# Patient Record
Sex: MALE | Race: WHITE | NOT HISPANIC OR LATINO | Employment: FULL TIME | URBAN - NONMETROPOLITAN AREA
[De-identification: names, ages, dates, MRNs, and addresses within clinical notes are randomized per-mention and may not be internally consistent; named-entity substitution may affect disease eponyms.]

---

## 2018-02-02 ENCOUNTER — HOSPITAL ENCOUNTER (EMERGENCY)
Facility: HOSPITAL | Age: 48
Discharge: HOME | End: 2018-02-02
Attending: NURSE PRACTITIONER | Admitting: NURSE PRACTITIONER
Payer: OTHER MISCELLANEOUS

## 2018-02-02 VITALS
OXYGEN SATURATION: 97 % | RESPIRATION RATE: 17 BRPM | SYSTOLIC BLOOD PRESSURE: 145 MMHG | HEART RATE: 69 BPM | TEMPERATURE: 98.4 F | DIASTOLIC BLOOD PRESSURE: 92 MMHG

## 2018-02-02 DIAGNOSIS — T30.0 BURN, FIRST DEGREE: ICD-10-CM

## 2018-02-02 PROCEDURE — 99202 OFFICE O/P NEW SF 15 MIN: CPT | Performed by: NURSE PRACTITIONER

## 2018-02-02 PROCEDURE — 90715 TDAP VACCINE 7 YRS/> IM: CPT | Performed by: NURSE PRACTITIONER

## 2018-02-02 PROCEDURE — 96372 THER/PROPH/DIAG INJ SC/IM: CPT

## 2018-02-02 PROCEDURE — G0463 HOSPITAL OUTPT CLINIC VISIT: HCPCS | Mod: 25

## 2018-02-02 PROCEDURE — 25000128 H RX IP 250 OP 636: Performed by: NURSE PRACTITIONER

## 2018-02-02 PROCEDURE — 90471 IMMUNIZATION ADMIN: CPT

## 2018-02-02 RX ORDER — SILVER SULFADIAZINE 10 MG/G
CREAM TOPICAL 2 TIMES DAILY
Qty: 50 G | Refills: 0 | Status: SHIPPED | OUTPATIENT
Start: 2018-02-02 | End: 2018-05-22

## 2018-02-02 RX ORDER — SILVER SULFADIAZINE 10 MG/G
CREAM TOPICAL ONCE
Status: DISCONTINUED | OUTPATIENT
Start: 2018-02-02 | End: 2018-02-02 | Stop reason: HOSPADM

## 2018-02-02 RX ADMIN — CLOSTRIDIUM TETANI TOXOID ANTIGEN (FORMALDEHYDE INACTIVATED), CORYNEBACTERIUM DIPHTHERIAE TOXOID ANTIGEN (FORMALDEHYDE INACTIVATED), BORDETELLA PERTUSSIS TOXOID ANTIGEN (GLUTARALDEHYDE INACTIVATED), BORDETELLA PERTUSSIS FILAMENTOUS HEMAGGLUTININ ANTIGEN (FORMALDEHYDE INACTIVATED), BORDETELLA PERTUSSIS PERTACTIN ANTIGEN, AND BORDETELLA PERTUSSIS FIMBRIAE 2/3 ANTIGEN 0.5 ML: 5; 2; 2.5; 5; 3; 5 INJECTION, SUSPENSION INTRAMUSCULAR at 09:15

## 2018-02-02 ASSESSMENT — ENCOUNTER SYMPTOMS
APPETITE CHANGE: 0
DYSURIA: 0
ACTIVITY CHANGE: 0
WOUND: 1
PSYCHIATRIC NEGATIVE: 1
WEAKNESS: 0
COUGH: 0
TROUBLE SWALLOWING: 0
VOMITING: 0
CHILLS: 0
COLOR CHANGE: 1
FEVER: 0
NAUSEA: 0
DIARRHEA: 0

## 2018-02-02 NOTE — DISCHARGE INSTRUCTIONS
Use Silvadene cream as directed.   Take tylenol and/ibuprofen for pain. Follow directions on package.   No work today.   Follow up on 2/5/18 at 0900 with Dr. Tushar Martino for a wound check.   Return to urgent care or emergency department with any increase in symptoms or concerns.

## 2018-02-02 NOTE — ED AVS SNAPSHOT
[facility]    [address]    [facility] [place] [place] [address]    Phone:  [phone]                                       Bayron King   MRN: [id number]    Department:  [facility]   Date of Visit:  2/2/2018           Patient Information     Date Of Birth          1970        Your diagnoses for this visit were:     Burn, first degree        You were seen by Nicole Cosby NP.      Follow-up Information     Follow up with SIDRA Martino MD In 3 days.    Specialty:  Family Practice    Why:  Appointment made on 2/5/2018 at 0900.     Contact information:    [facility] [place]  [address]  [place] [address] [phone]          Follow up with [facility].    Specialty:  EMERGENCY MEDICINE    Why:  As needed, If symptoms worsen    Contact information:    [address] [address] [phone]    Additional information:    From [place]: Take [address]. Turn left at [address]. Turn left at the first stoplight on [address]. At the first stop sign, take a right onto [address]. Take a left into the parking lot and continue through until you reach the North enterance of the building.       From [place]: Take [address]. Take the [address] ramp towards [place]. Turn left onto [address]. Take a slight right onto [address]. Turn left at the first stoplight on [address]. At the first stop sign, take a right onto [address]. Take a left into the parking lot and continue through until you reach the North enterance of the building.       From [place]: Take [address]. Take a right at [address]. At the first stop sign, take a right onto [address]. Take a left into the parking lot and continue through until you reach the North enterance of the building.         Discharge Instructions       Use Silvadene cream as directed.   Take tylenol  and/ibuprofen for pain. Follow directions on package.   No work today.   Follow up on 2/5/18 at 0900 with Dr. Tushar Martino for a wound check.   Return to urgent care or emergency department with any increase in symptoms or concerns.     Discharge References/Attachments     BURN, THERMAL, 1ST- AND 2ND-DEGREE W/ DRESSING (ENGLISH)      Future Appointments        Provider Department Dept Phone Center    2/5/2018 9:15 AM SIDRA Martino MD [facility] [phone] [facility]         Review of your medicines      START taking        Dose / Directions Last dose taken    silver sulfADIAZINE 1 % cream   Commonly known as:  SILVADENE   Quantity:  50 g        Apply topically 2 times daily   Refills:  0          Our records show that you are taking the medicines listed below. If these are incorrect, please call your family doctor or clinic.        Dose / Directions Last dose taken    MULTIVITAMIN ADULTS PO   Dose:  1 tablet        Take 1 tablet by mouth   Refills:  0        OMEPRAZOLE PO        Refills:  0                Prescriptions were sent or printed at these locations (1 Prescription)                   [facility]  [address]   [address] [facility] [place] [address]    Telephone:  [phone]   Fax:  [phone]   Hours:                  E-Prescribed (1 of 1)         silver sulfADIAZINE (SILVADENE) 1 % cream                Orders Needing Specimen Collection     None      Pending Results     No orders found from 1/31/2018 to 2/3/2018.            Pending Culture Results     No orders found from 1/31/2018 to 2/3/2018.            Thank you for choosing [facility]       Thank you for choosing [facility] for your care. Our goal is always to provide you with excellent care. Hearing back from our patients is one way we can continue to improve our services. Please take a few minutes to complete the written survey that you may receive in the mail after you visit with us. Thank you!       "  MyChart Information     [employer]fox lets you send messages to your doctor, view your test results, renew your prescriptions, schedule appointments and more. To sign up, go to [email or web address] . Click on \"Log in\" on the left side of the screen, which will take you to the Welcome page. Then click on \"Sign up Now\" on the right side of the page.     You will be asked to enter the access code listed below, as well as some personal information. Please follow the directions to create your username and password.     Your access code is: [email or web address]  Expires: 5/3/2018  9:33 AM     Your access code will  in 90 days. If you need help or a new code, please call your [place] clinic or [phone].        Care EveryWhere ID     This is your Care EveryWhere ID. This could be used by other organizations to access your [place] medical records  [id number]        Equal Access to Services     DAVID BRASHER : Hadbassam mehtao Soblanca, waaxda luracquel, qaybta kaalmamari chavez, jerilyn castelan . So [facility] [phone].    ATENCIÓN: Si habla español, tiene a wright disposición servicios gratuitos de asistencia lingüística. Llame al [phone].    We comply with applicable federal civil rights laws and [place] laws. We do not discriminate on the basis of race, color, national origin, age, disability, sex, sexual orientation, or gender identity.            After Visit Summary       This is your record. Keep this with you and show to your community pharmacist(s) and doctor(s) at your next visit.                  "

## 2018-02-02 NOTE — ED NOTES
Pt has a painful burn to his right hand after he grabbed a railing that had just been welded on 15 mins prior this morning at work.

## 2018-02-02 NOTE — ED AVS SNAPSHOT
[facility]    [address] [address]    Phone:  [phone]                                       Bayron King   MRN: [id number]    Department:  [facility]   Date of Visit:  2/2/2018           After Visit Summary Signature Page     I have received my discharge instructions, and my questions have been answered. I have discussed any challenges I see with this plan with the nurse or doctor.    ..........................................................................................................................................  Patient/Patient Representative Signature      ..........................................................................................................................................  Patient Representative Print Name and Relationship to Patient    ..................................................               ................................................  Date                                            Time    ..........................................................................................................................................  Reviewed by Signature/Title    ...................................................              ..............................................  Date                                                            Time

## 2018-02-02 NOTE — ED PROVIDER NOTES
History     Chief Complaint   Patient presents with     Hand Burn     right hand burned on a hot rail that had just been welded while at work at [employer].     The history is provided by the patient. No  was used.     Bayron King is a 47 year old male who presents with a burn on his right hand. He burnt his right hand on a hand rail at work that was welded 15 minutes prior. 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 He works for [employer]. He applied a cooling gel and the paramedics wrapped his hand. He works as an  and is right hand dominant. Denies fever, chills, or night sweats. Eating and drinking well. Bowel and bladder are working well. Last tetanus was in 2010.        Medications:      Multiple Vitamins-Minerals (MULTIVITAMIN ADULTS PO)   silver sulfADIAZINE (SILVADENE) 1 % cream   OMEPRAZOLE PO         Review of Systems   Constitutional: Negative for activity change, appetite change, chills and fever.   HENT: Negative for trouble swallowing.    Respiratory: Negative for cough.    Gastrointestinal: Negative for diarrhea, nausea and vomiting.   Genitourinary: Negative for dysuria.   Skin: Positive for color change and wound.        Burn to right hand.    Neurological: Negative for weakness.   Psychiatric/Behavioral: Negative.        Physical Exam   BP: 145/92  Pulse: 69  Temp: 98.4  F (36.9  C)  Resp: 17  SpO2: 97 %      Physical Exam   Constitutional: He is oriented to person, place, and time. He appears well-developed and well-nourished. No distress.   HENT:   Head: Normocephalic.   Mouth/Throat: Oropharynx is clear and moist.   Neck: Normal range of motion. Neck supple.   Cardiovascular: Normal rate, regular rhythm, normal heart sounds and intact distal pulses.    No murmur heard.  Pulmonary/Chest: Effort normal. No respiratory distress. He has no wheezes. He has no rales.   Abdominal: Soft. He exhibits  no distension.   Musculoskeletal: Normal range of motion. He exhibits tenderness. He exhibits no edema or deformity.        Hands:  CMS and ROM intact to right upper extremity. Right radial pulse +2. Extension and flexion intact to all digits on right hand. Palmar surface across middle of all digits on right hand with a white discoloration and scant small pinpoint blister formation. Area is consistent with a hand grasp to a hand rail.    Lymphadenopathy:     He has no cervical adenopathy.   Neurological: He is alert and oriented to person, place, and time. He exhibits normal muscle tone.   Skin: Skin is warm and dry. No rash noted. He is not diaphoretic.   Psychiatric: He has a normal mood and affect. His behavior is normal.   Nursing note and vitals reviewed.      ED Course     ED Course     Procedures    Medications   Tdap (tetanus-diphtheria-acell pertussis) (ADACEL) injection 0.5 mL (0.5 mLs Intramuscular Given 2/2/18 0915)     Monitored for 20 minutes and tolerated well.     Washed cooling gel off in exam room.     Silvadene and dressing applied per LPN. Silvadene felt better to burn area.     Assessments & Plan (with Medical Decision Making)     Discussed plan of care. He verbalized understanding. All questions answered.     I have reviewed the nursing notes.    I have reviewed the findings, diagnosis, plan and need for follow up with the patient.  Discharged in stable condition.     Discharge Medication List as of 2/2/2018  9:33 AM      START taking these medications    Details   silver sulfADIAZINE (SILVADENE) 1 % cream Apply topically 2 times dailyDisp-50 g, [id number]             Final diagnoses:   Burn, first degree     Use Silvadene cream as directed.   Take tylenol and/ibuprofen for pain. Follow directions on package.   No work today.   Follow up on 2/5/18 at 0900 with Dr. Tushar Martino at [facility] [place] for a wound check.   Return to urgent care or emergency department with any  increase in symptoms or concerns.     MICHELLE Nesbitt  2/2/2018  9:00 AM  URGENT CARE CLINIC       Nicole Cosby NP  02/02/18 [phone]

## 2018-02-05 ENCOUNTER — OFFICE VISIT (OUTPATIENT)
Dept: FAMILY MEDICINE | Facility: OTHER | Age: 48
End: 2018-02-05
Attending: FAMILY MEDICINE
Payer: OTHER MISCELLANEOUS

## 2018-02-05 VITALS
SYSTOLIC BLOOD PRESSURE: 124 MMHG | HEIGHT: 69 IN | DIASTOLIC BLOOD PRESSURE: 78 MMHG | BODY MASS INDEX: 31.7 KG/M2 | TEMPERATURE: 97 F | HEART RATE: 68 BPM | OXYGEN SATURATION: 98 % | WEIGHT: 214 LBS

## 2018-02-05 DIAGNOSIS — T23.151D: Primary | ICD-10-CM

## 2018-02-05 PROCEDURE — 99213 OFFICE O/P EST LOW 20 MIN: CPT | Performed by: FAMILY MEDICINE

## 2018-02-05 ASSESSMENT — PAIN SCALES - GENERAL: PAINLEVEL: NO PAIN (0)

## 2018-02-05 NOTE — NURSING NOTE
"Chief Complaint   Patient presents with     Work Comp     burn to right hand        Initial /78 (BP Location: Left arm, Patient Position: Chair, Cuff Size: Adult Large)  Pulse 68  Temp 97  F (36.1  C) (Tympanic)  Ht 5' 9.25\" (1.759 m)  Wt 214 lb (97.1 kg)  SpO2 98%  BMI 31.37 kg/m2 Estimated body mass index is 31.37 kg/(m^2) as calculated from the following:    Height as of this encounter: 5' 9.25\" (1.759 m).    Weight as of this encounter: 214 lb (97.1 kg).  Medication Reconciliation: complete   Paulina Wynne CMA(AAMA)   "

## 2018-02-05 NOTE — MR AVS SNAPSHOT
"              After Visit Summary   2/5/2018    Bayron King    MRN: [id number]           Patient Information     Date Of Birth          1970        Visit Information        Provider Department      2/5/2018 9:15 AM SIDRA Martino MD [facility] [place]         Follow-ups after your visit        Your next 10 appointments already scheduled     Feb 05, 2018  9:15 AM CST   (Arrive by 9:00 AM)   SHORT with SIDRA Martino MD   [facility] [place] ([facility] [place] )    [address]  [place] [address]   [phone]              Who to contact     If you have questions or need follow up information about today's clinic visit or your schedule please contact [facility] directly at [phone].  Normal or non-critical lab and imaging results will be communicated to you by MyChart, letter or phone within 4 business days after the clinic has received the results. If you do not hear from us within 7 days, please contact the clinic through MyChart or phone. If you have a critical or abnormal lab result, we will notify you by phone as soon as possible.  Submit refill requests through [employer] or call your pharmacy and they will forward the refill request to us. Please allow 3 business days for your refill to be completed.          Additional Information About Your Visit        Care EveryWhere ID     This is your Care EveryWhere ID. This could be used by other organizations to access your [place] medical records  [id number]        Your Vitals Were     Pulse Temperature Height Pulse Oximetry BMI (Body Mass Index)       68 97  F (36.1  C) (Tympanic) 5' 9.25\" (1.759 m) 98% 31.37 kg/m2        Blood Pressure from Last 3 Encounters:   02/05/18 124/78   02/02/18 145/92   09/09/13 137/95    Weight from Last 3 Encounters:   02/05/18 214 lb (97.1 kg)              Today, you had the following     No orders found for display       Primary Care Provider Fax #    Physician No Ref-Primary " [phone]       No address on file        Equal Access to Services     FALLONLENORA SAMEERA : Hadii aad ku hadpatricknaldo Merdano, waasifmari barber, ivelissenanci gravesmamari chavez, jerilyn hobsonamauritoya santa. So [facility] [phone].    ATENCIÓN: Si habla español, tiene a wright disposición servicios gratuitos de asistencia lingüística. Llame al [phone].    We comply with applicable federal civil rights laws and [place] laws. We do not discriminate on the basis of race, color, national origin, age, disability, sex, sexual orientation, or gender identity.            Thank you!     Thank you for choosing [facility]  for your care. Our goal is always to provide you with excellent care. Hearing back from our patients is one way we can continue to improve our services. Please take a few minutes to complete the written survey that you may receive in the mail after your visit with us. Thank you!             Your Updated Medication List - Protect others around you: Learn how to safely use, store and throw away your medicines at www.disposemymeds.org.          This list is accurate as of 2/5/18  9:10 AM.  Always use your most recent med list.                   Brand Name Dispense Instructions for use Diagnosis    MULTIVITAMIN ADULTS PO      Take 1 tablet by mouth daily        OMEPRAZOLE PO      Take 20 mg by mouth every morning        silver sulfADIAZINE 1 % cream    SILVADENE    50 g    Apply topically 2 times daily

## 2018-02-05 NOTE — PROGRESS NOTES
"Occupational Visit     Subjective:  Bayron King, 48 year old, male is seen 2/5/18.  The date of injury is 2/2/18.  The patient is employed at [employer] .  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  He was seen on February 2 in the emergency room      No Known Allergies      Review of Systems:  Constitutional, HEENT, cardiovascular, pulmonary, gi and gu systems are negative, except as otherwise noted.      OBJECTIVE:  Vitals: B/P: /78 (BP Location: Left arm, Patient Position: Chair, Cuff Size: Adult Large)  Pulse 68  Temp 97  F (36.1  C) (Tympanic)  Ht 5' 9.25\" (1.759 m)  Wt 214 lb (97.1 kg)  SpO2 98%  BMI 31.37 kg/m2  Patient is breathing comfortably in no distress.  Area was burned his right palm through the base of the fingers and across all the fingers.  Currently now there is no evidence of any tissue damage.  There is no redness or break in the skin or blisters.  He has good range of motion and no tenderness.      Labs: None      ASSESSMENT/PLAN:(T23.151D) Superficial burn of palm of right hand, subsequent encounter  (primary encounter diagnosis)  Comment: Patient was in the emergency room on February 2 had a placed burn cream on it.  Basically the burn Has resolved he is doing fine wrote a slip he can return to full activities he reached MMI as of today  Plan:     KIRSTIE Martino M.D.  February 5, 2018    "

## 2018-05-22 ENCOUNTER — HOSPITAL ENCOUNTER (EMERGENCY)
Facility: HOSPITAL | Age: 48
Discharge: HOME | End: 2018-05-22
Attending: PHYSICIAN ASSISTANT | Admitting: PHYSICIAN ASSISTANT
Payer: COMMERCIAL

## 2018-05-22 ENCOUNTER — APPOINTMENT (OUTPATIENT)
Dept: ULTRASOUND IMAGING | Facility: HOSPITAL | Age: 48
End: 2018-05-22
Attending: PHYSICIAN ASSISTANT
Payer: COMMERCIAL

## 2018-05-22 VITALS
OXYGEN SATURATION: 97 % | SYSTOLIC BLOOD PRESSURE: 153 MMHG | HEART RATE: 85 BPM | RESPIRATION RATE: 14 BRPM | DIASTOLIC BLOOD PRESSURE: 87 MMHG | TEMPERATURE: 97.5 F

## 2018-05-22 DIAGNOSIS — Z80.43 FAMILY HISTORY OF TESTICULAR CANCER: ICD-10-CM

## 2018-05-22 DIAGNOSIS — N50.9 TESTICLE TROUBLE: ICD-10-CM

## 2018-05-22 LAB
ALBUMIN UR-MCNC: 10 MG/DL
APPEARANCE UR: CLEAR
BACTERIA #/AREA URNS HPF: ABNORMAL /HPF
BILIRUB UR QL STRIP: NEGATIVE
C TRACH DNA SPEC QL PROBE+SIG AMP: NOT DETECTED
COLOR UR AUTO: YELLOW
GLUCOSE UR STRIP-MCNC: NEGATIVE MG/DL
HGB UR QL STRIP: NEGATIVE
KETONES UR STRIP-MCNC: NEGATIVE MG/DL
LEUKOCYTE ESTERASE UR QL STRIP: NEGATIVE
MUCOUS THREADS #/AREA URNS LPF: PRESENT /LPF
N GONORRHOEA DNA SPEC QL PROBE+SIG AMP: NOT DETECTED
NITRATE UR QL: NEGATIVE
PH UR STRIP: 6 PH (ref 4.7–8)
RBC #/AREA URNS AUTO: <1 /HPF (ref 0–2)
SOURCE: ABNORMAL
SP GR UR STRIP: 1.02 (ref 1–1.03)
SPECIMEN SOURCE: NORMAL
UROBILINOGEN UR STRIP-MCNC: NORMAL MG/DL (ref 0–2)
WBC #/AREA URNS AUTO: <1 /HPF (ref 0–5)

## 2018-05-22 PROCEDURE — 76870 US EXAM SCROTUM: CPT | Mod: TC

## 2018-05-22 PROCEDURE — 87491 CHLMYD TRACH DNA AMP PROBE: CPT | Performed by: PHYSICIAN ASSISTANT

## 2018-05-22 PROCEDURE — 99213 OFFICE O/P EST LOW 20 MIN: CPT | Performed by: PHYSICIAN ASSISTANT

## 2018-05-22 PROCEDURE — 87591 N.GONORRHOEAE DNA AMP PROB: CPT | Performed by: PHYSICIAN ASSISTANT

## 2018-05-22 PROCEDURE — 81001 URINALYSIS AUTO W/SCOPE: CPT | Performed by: PHYSICIAN ASSISTANT

## 2018-05-22 PROCEDURE — G0463 HOSPITAL OUTPT CLINIC VISIT: HCPCS | Mod: 25

## 2018-05-22 ASSESSMENT — ENCOUNTER SYMPTOMS
RESPIRATORY NEGATIVE: 1
PSYCHIATRIC NEGATIVE: 1
CARDIOVASCULAR NEGATIVE: 1
CONSTITUTIONAL NEGATIVE: 1
NEUROLOGICAL NEGATIVE: 1

## 2018-05-22 NOTE — DISCHARGE INSTRUCTIONS
- range of normal on the ultrasound  - No antibiotic at this time.   - Back here with pain, fevers, trouble urinating, concerns.

## 2018-05-22 NOTE — ED AVS SNAPSHOT
[facility]    [address] [address]    Phone:  [phone]                                       Bayron King   MRN: [id number]    Department:  [facility]   Date of Visit:  5/22/2018           Patient Information     Date Of Birth          1970        Your diagnoses for this visit were:     Testicle trouble     Family history of testicular cancer        You were seen by Devon Andrade PA.      Follow-up Information     Follow up with No Ref-Primary, Physician In 1 week.        Follow up with [facility].    Specialty:  EMERGENCY MEDICINE    Why:  If symptoms worsen    Contact information:    [address] [address] [phone]    Additional information:    From [facility]: Take [address]. Turn left at [address]. Turn left at the first stoplight on [address]. At the first stop sign, take a right onto [address]. Take a left into the parking lot and continue through until you reach the North enterance of the building.       From [place]: Take [address]. Take the [address] ramp towards [place]. Turn left onto [address]. Take a slight right onto [address]. Turn left at the first stoplight on [address]. At the first stop sign, take a right onto [address]. Take a left into the parking lot and continue through until you reach the North enterance of the building.       From [place]: Take [address]. Take a right at [address]. At the first stop sign, take a right onto [address]. Take a left into the parking lot and continue through until you reach the North enterance of the building.         Discharge Instructions       - range of normal on the ultrasound  - No antibiotic at this time.   - Back here with pain, fevers, trouble urinating, concerns.     Discharge References/Attachments     TESTICULAR SELF-EXAM (MARGO)  (ENGLISH)        "  Review of your medicines      Our records show that you are taking the medicines listed below. If these are incorrect, please call your family doctor or clinic.        Dose / Directions Last dose taken    MULTIVITAMIN ADULTS PO   Dose:  1 tablet        Take 1 tablet by mouth daily   Refills:  0        OMEPRAZOLE PO   Dose:  20 mg        Take 20 mg by mouth every morning   Refills:  0                Procedures and tests performed during your visit     GC/Chlamydia by PCR - [facility]    UA reflex to Microscopic    US Testicular & Scrotum w [employer]      Orders Needing Specimen Collection     Ordered          05/22/18 1021  UA reflex to Microscopic - STAT, Prio: STAT, Final result     Scheduled Task Status   05/22/18 1022 [employer] CC Reminder: Open   Order Class:  PCU Collect                05/22/18 1021  GC/Chlamydia by PCR - [facility] - STAT, [facility]: STAT, In process     Scheduled Task Status   05/22/18 1022 [employer] CC Reminder: Open   Order Class:  PCU Collect                  Pending Results     Date and Time Order Name Status Description    5/22/2018 1021 GC/Chlamydia by PCR - HIGH In process             Pending Culture Results     Date and Time Order Name Status Description    5/22/2018 1021 GC/Chlamydia by PCR - [facility] In process             Thank you for choosing [facility]       Thank you for choosing [facility] for your care. Our goal is always to provide you with excellent care. Hearing back from our patients is one way we can continue to improve our services. Please take a few minutes to complete the written survey that you may receive in the mail after you visit with us. Thank you!        [employer] Information     [employer] lets you send messages to your doctor, view your test results, renew your prescriptions, schedule appointments and more. To sign up, go to [email or web address] . Click on \"Log in\" on the left side of the screen, which will take you to the Welcome page. Then click on \"Sign up Now\" on the right " side of the page.     You will be asked to enter the access code listed below, as well as some personal information. Please follow the directions to create your username and password.     Your access code is: [email or web address]  Expires: 2018 12:02 PM     Your access code will  in 90 days. If you need help or a new code, please call your [place] clinic or [phone].        Care EveryWhere ID     This is your Care EveryWhere ID. This could be used by other organizations to access your [place] medical records  [id number]        Equal Access to Services     [facility] Center[facility]RA : Hadbassam Medrano, kody barber, anuj chavez, jerilyn santa. So [facility] [phone].    ATENCIÓN: Si habla español, tiene a wright disposición servicios gratuitos de asistencia lingüística. Llame al [phone].    We comply with applicable federal civil rights laws and [place] laws. We do not discriminate on the basis of race, color, national origin, age, disability, sex, sexual orientation, or gender identity.            After Visit Summary       This is your record. Keep this with you and show to your community pharmacist(s) and doctor(s) at your next visit.

## 2018-05-22 NOTE — ED NOTES
Pt ambulates to 6 with c/o mass to right testicle that he found last night, pt reports family history of testicular cancer

## 2018-05-22 NOTE — ED PROVIDER NOTES
"  History     Chief Complaint   Patient presents with     Mass     testicular     HPI  Bayron King is a 48 year old male with FamHx testicular Ca who presents to  c/o right testicular mass. He noted this a few days ago and it seems to have grown to 3cm or so in size. He denies fever. He denies rash/skin lesions. 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 Pain is described as \"no real pain\".     Problem List:    There are no active problems to display for this patient.       Past Medical History:    History reviewed. No pertinent past medical history.    Past Surgical History:    History reviewed. No pertinent surgical history.    Family History:    No family history on file.    Social History:  Marital Status:   [2]  Social History   Substance Use Topics     Smoking status: Never Smoker     Smokeless tobacco: Never Used     Alcohol use Yes        Medications:      Multiple Vitamins-Minerals (MULTIVITAMIN ADULTS PO)   OMEPRAZOLE PO         Review of Systems   Constitutional: Negative.    Respiratory: Negative.    Cardiovascular: Negative.    Genitourinary: Positive for scrotal swelling.   Skin: Negative.    Neurological: Negative.    Psychiatric/Behavioral: Negative.        Physical Exam   BP: 153/87  Pulse: 85  Temp: 97.5  F (36.4  C)  Resp: 14  SpO2: 97 %      Physical Exam   Constitutional: He is oriented to person, place, and time. He appears well-developed and well-nourished. No distress.   Cardiovascular: Normal rate.    Pulmonary/Chest: Effort normal.   Abdominal: Hernia confirmed negative in the right inguinal area and confirmed negative in the left inguinal area.   Genitourinary: Penis normal. Right testis shows mass (no well circumscribed mass, pt feels 1inch area distally, I do not)). Right testis shows no swelling and no tenderness. Left testis shows no mass, no swelling and no tenderness.   Neurological: He is alert and oriented to person, place, " and time.   Skin: Skin is warm and dry.   Psychiatric: He has a normal mood and affect.   Nursing note and vitals reviewed.      ED Course     ED Course     Procedures      Results for orders placed or performed during the hospital encounter of 05/22/18 (from the past 24 hour(s))   UA reflex to Microscopic   Result Value Ref Range    Color Urine Yellow     Appearance Urine Clear     Glucose Urine Negative NEG^Negative mg/dL    Bilirubin Urine Negative NEG^Negative    Ketones Urine Negative NEG^Negative mg/dL    Specific Gravity Urine 1.020 1.003 - 1.035    Blood Urine Negative NEG^Negative    pH Urine 6.0 4.7 - 8.0 pH    Protein Albumin Urine 10 (A) NEG^Negative mg/dL    Urobilinogen mg/dL Normal 0.0 - 2.0 mg/dL    Nitrite Urine Negative NEG^Negative    Leukocyte Esterase Urine Negative NEG^Negative    Source Midstream Urine     RBC Urine <1 0 - 2 /HPF    WBC Urine <1 0 - 5 /HPF    Bacteria Urine None (A) NEG^Negative /HPF    Mucous Urine Present (A) NEG^Negative /LPF   GC/Chlamydia by PCR - [facility]   Result Value Ref Range    Specimen Source Urine     Neisseria gonorrhoreae PCR Not Detected NDET^Not Detected    Chlamydia Trachomatis PCR Not Detected NDET^Not Detected   US Testicular & Scrotum w Doppler Ltd    Narrative    PROCEDURE: US TESTICULAR AND SCROTUM WITH DOPPLER LIMITED 5/22/2018  11:15 AM    HISTORY: right, mass, Hx testicular Ca;     COMPARISONS: None.    TECHNIQUE: Ultrasound the testes    FINDINGS: The right testis measures 4.6 x 3.3 x 2.5 cm. The left  testis 4.3 x 3.4 x 2.7 cm. No testicular masses are seen. Normal  arterial and venous flow to both testes is seen. No epididymal  abnormalities are seen. No scrotal fluid is noted.         Impression    IMPRESSION: Negative ultrasound the testes no testicular masses seen    USHA STOCK MD       Medications - No data to display    Assessments & Plan (with Medical Decision Making)     I have reviewed the nursing notes.  I have reviewed the findings,  diagnosis, plan and need for follow up with the patient.    Discharge Medication List as of 5/22/2018 12:02 PM          Final diagnoses:   Testicle trouble   Family history of testicular cancer   Negative UA and US. Pt denies any pain. He is reassured that imaging and labs are WNL and with that, he prefers to f/u PRN. Discussed return here or PCP with any increased swelling, onset pain, redness, dysuria, fever. Patient verbally educated and given appropriate education sheets for each of the diagnoses and has no questions.    Devon Andrade PA-C   5/22/2018   12:36 PM    5/22/2018   [facility]     Devon Andrade PA  05/22/18 0818

## 2018-05-22 NOTE — LETTER
[facility] [address] [place] [id number]  Main: [phone]  Dept: [phone]      May 22, 2018      Re: Bayron King      TO WHOM IT MAY CONCERN:    Bayron King was in the urgent care today. Please excuse Bayron from missing work.        Sincerely,    TALI Estes

## 2018-05-22 NOTE — ED AVS SNAPSHOT
[facility]    [address] [address]    Phone:  [phone]                                       Bayron King   MRN: [id number]    Department:  [facility]   Date of Visit:  5/22/2018           After Visit Summary Signature Page     I have received my discharge instructions, and my questions have been answered. I have discussed any challenges I see with this plan with the nurse or doctor.    ..........................................................................................................................................  Patient/Patient Representative Signature      ..........................................................................................................................................  Patient Representative Print Name and Relationship to Patient    ..................................................               ................................................  Date                                            Time    ..........................................................................................................................................  Reviewed by Signature/Title    ...................................................              ..............................................  Date                                                            Time

## 2020-08-07 ENCOUNTER — VIRTUAL VISIT (OUTPATIENT)
Dept: FAMILY MEDICINE | Facility: OTHER | Age: 50
End: 2020-08-07
Attending: PHYSICIAN ASSISTANT
Payer: COMMERCIAL

## 2020-08-07 DIAGNOSIS — Z20.822 COVID-19 RULED OUT: Primary | ICD-10-CM

## 2020-08-07 DIAGNOSIS — Z20.822 COVID-19 RULED OUT: ICD-10-CM

## 2020-08-07 PROCEDURE — 99207 ZZC NO CHARGE NURSE ONLY: CPT

## 2020-08-07 PROCEDURE — 99202 OFFICE O/P NEW SF 15 MIN: CPT | Performed by: PHYSICIAN ASSISTANT

## 2020-08-07 PROCEDURE — C9803 HOPD COVID-19 SPEC COLLECT: HCPCS

## 2020-08-07 PROCEDURE — U0003 INFECTIOUS AGENT DETECTION BY NUCLEIC ACID (DNA OR RNA); SEVERE ACUTE RESPIRATORY SYNDROME CORONAVIRUS 2 (SARS-COV-2) (CORONAVIRUS DISEASE [COVID-19]), AMPLIFIED PROBE TECHNIQUE, MAKING USE OF HIGH THROUGHPUT TECHNOLOGIES AS DESCRIBED BY CMS-2020-01-R: HCPCS | Mod: ZL | Performed by: PHYSICIAN ASSISTANT

## 2020-08-07 SDOH — HEALTH STABILITY: MENTAL HEALTH: HOW OFTEN DO YOU HAVE A DRINK CONTAINING ALCOHOL?: MONTHLY OR LESS

## 2020-08-07 NOTE — NURSING NOTE
Chief Complaint   Patient presents with     Covid 19 Testing       Patient swabbed for COVID-19 testing.  Sulaiman Castillo LPN on 8/7/2020 at 10:07 AM

## 2020-08-07 NOTE — PROGRESS NOTES
"Bayron King is a 50 year old male who is being evaluated via a billable telephone visit.      The patient has been notified of following:     \"This telephone visit will be conducted via a call between you and your physician/provider. We have found that certain health care needs can be provided without the need for a physical exam.  This service lets us provide the care you need with a short phone conversation.  If a prescription is necessary we can send it directly to your pharmacy.  If lab work is needed we can place an order for that and you can then stop by our lab to have the test done at a later time.    Telephone visits are billed at different rates depending on your insurance coverage. During this emergency period, for some insurers they may be billed the same as an in-person visit.  Please reach out to your insurance provider with any questions.    If during the course of the call the physician/provider feels a telephone visit is not appropriate, you will not be charged for this service.\"    Patient has given verbal consent for Telephone visit?  Yes    What phone number would you like to be contacted at?  [phone]    How would you like to obtain your AVS? Mail a copy    Subjective     Bayron King is a 50 year old male who presents via phone visit today for the following health issues:    HPI  Patient states he was in contact with someone who tested positive for COVID-19 within the last 4 days.  He states he was in contact with this person through a ride share program for greater than 15 minutes and less than 6 feet.  He denies any new cough, shortness of breath, fevers, chills, night sweats, loss of taste smell, or new rashes.  Patient states also he is living with his brother who is just under treatment for cancer.      There is no problem list on file for this patient.    No past surgical history on file.    Social History     Tobacco Use     Smoking status: Never Smoker     Smokeless tobacco: " Never Used   Substance Use Topics     Alcohol use: Yes     Frequency: Monthly or less     No family history on file.        Reviewed and updated as needed this visit by Provider         Review of Systems   Constitutional, HEENT, cardiovascular, pulmonary, gi and gu systems are negative, except as otherwise noted.       Objective   Reported vitals:  There were no vitals taken for this visit.   healthy, alert and no distress  PSYCH: Alert and oriented times 3; coherent speech, normal   rate and volume, able to articulate logical thoughts, able   to abstract reason, no tangential thoughts, no hallucinations   or delusions  His affect is normal  RESP: No cough, no audible wheezing, able to talk in full sentences  Remainder of exam unable to be completed due to telephone visits    Diagnostic Test Results:  Labs reviewed in Epic        Assessment/Plan:  1. COVID-19 ruled out  - Asymptomatic COVID-19 Virus (Coronavirus) by PCR; Future    No follow-ups on file.      Phone call duration:  5 minutes  TALI Lacy

## 2020-08-07 NOTE — NURSING NOTE
Patient has been in contact with someone that has been in close contact with a positive covid. Patient is asymtomatic. Wife does work in a long term care facility.   Call [phone]. Please mail RICKY Barr LPN .............8/7/2020  8:47 AM

## 2020-08-08 LAB
SARS-COV-2 RNA SPEC QL NAA+PROBE: NOT DETECTED
SPECIMEN SOURCE: NORMAL

## 2020-11-06 ENCOUNTER — ALLIED HEALTH/NURSE VISIT (OUTPATIENT)
Dept: FAMILY MEDICINE | Facility: OTHER | Age: 50
End: 2020-11-06
Attending: FAMILY MEDICINE
Payer: COMMERCIAL

## 2020-11-06 DIAGNOSIS — R53.83 FATIGUE: Primary | ICD-10-CM

## 2020-11-06 PROCEDURE — 99207 PR NO CHARGE NURSE ONLY: CPT

## 2020-11-06 PROCEDURE — C9803 HOPD COVID-19 SPEC COLLECT: HCPCS

## 2020-11-06 PROCEDURE — U0003 INFECTIOUS AGENT DETECTION BY NUCLEIC ACID (DNA OR RNA); SEVERE ACUTE RESPIRATORY SYNDROME CORONAVIRUS 2 (SARS-COV-2) (CORONAVIRUS DISEASE [COVID-19]), AMPLIFIED PROBE TECHNIQUE, MAKING USE OF HIGH THROUGHPUT TECHNOLOGIES AS DESCRIBED BY CMS-2020-01-R: HCPCS | Mod: ZL | Performed by: FAMILY MEDICINE

## 2020-11-06 NOTE — NURSING NOTE
Chief Complaint   Patient presents with     Covid 19 Testing       Patient here for Covid testing, symptomatic     Lilian Campos

## 2020-11-07 LAB
SARS-COV-2 RNA SPEC QL NAA+PROBE: NOT DETECTED
SPECIMEN SOURCE: NORMAL

## 2020-12-20 ENCOUNTER — HEALTH MAINTENANCE LETTER (OUTPATIENT)
Age: 50
End: 2020-12-20

## 2021-10-03 ENCOUNTER — HEALTH MAINTENANCE LETTER (OUTPATIENT)
Age: 51
End: 2021-10-03

## 2022-01-23 ENCOUNTER — HEALTH MAINTENANCE LETTER (OUTPATIENT)
Age: 52
End: 2022-01-23

## 2022-09-10 ENCOUNTER — HEALTH MAINTENANCE LETTER (OUTPATIENT)
Age: 52
End: 2022-09-10

## 2023-03-03 ENCOUNTER — HOSPITAL ENCOUNTER (EMERGENCY)
Facility: HOSPITAL | Age: 53
Discharge: HOME | End: 2023-03-03
Attending: NURSE PRACTITIONER | Admitting: NURSE PRACTITIONER
Payer: OTHER MISCELLANEOUS

## 2023-03-03 ENCOUNTER — APPOINTMENT (OUTPATIENT)
Dept: CT IMAGING | Facility: HOSPITAL | Age: 53
End: 2023-03-03
Attending: STUDENT IN AN ORGANIZED HEALTH CARE EDUCATION/TRAINING PROGRAM
Payer: OTHER MISCELLANEOUS

## 2023-03-03 VITALS
DIASTOLIC BLOOD PRESSURE: 97 MMHG | RESPIRATION RATE: 18 BRPM | SYSTOLIC BLOOD PRESSURE: 135 MMHG | OXYGEN SATURATION: 96 % | HEART RATE: 66 BPM | TEMPERATURE: 98.7 F

## 2023-03-03 DIAGNOSIS — S16.1XXA CERVICAL STRAIN, INITIAL ENCOUNTER: ICD-10-CM

## 2023-03-03 PROBLEM — K21.9 ACID REFLUX: Status: ACTIVE | Noted: 2023-03-03

## 2023-03-03 PROBLEM — M51.27 HERNIATED NUCLEUS PULPOSUS, L5-S1, RIGHT: Status: ACTIVE | Noted: 2019-08-26

## 2023-03-03 PROCEDURE — 99284 EMERGENCY DEPT VISIT MOD MDM: CPT | Performed by: NURSE PRACTITIONER

## 2023-03-03 PROCEDURE — 72125 CT NECK SPINE W/O DYE: CPT

## 2023-03-03 PROCEDURE — 70450 CT HEAD/BRAIN W/O DYE: CPT

## 2023-03-03 PROCEDURE — 99284 EMERGENCY DEPT VISIT MOD MDM: CPT | Mod: 25

## 2023-03-03 RX ORDER — CYCLOBENZAPRINE HCL 10 MG
10 TABLET ORAL 3 TIMES DAILY PRN
Qty: 10 TABLET | Refills: 0 | Status: SHIPPED | OUTPATIENT
Start: 2023-03-03

## 2023-03-03 RX ORDER — LOSARTAN POTASSIUM 25 MG/1
TABLET ORAL
COMMUNITY
Start: 2023-01-31

## 2023-03-03 ASSESSMENT — ACTIVITIES OF DAILY LIVING (ADL): ADLS_ACUITY_SCORE: 35

## 2023-03-03 NOTE — ED TRIAGE NOTES
Pt presents with c/o being hit in the head and neck with a light fixture at work today. Pt did have a hard hat on, knocked him off a ladder and now has pain from the top of his shoulder blades to the top of his head. C-collar placed.

## 2023-03-03 NOTE — PLAN OF CARE
Pt here after a light fixture fell on the back of his today. Denies LOC. Denies nausea. C/o slight pressure behind his eyes but denies pain. Neuro exam intact. TALI Curry at bedside.

## 2023-03-03 NOTE — DISCHARGE INSTRUCTIONS
Medication Warnings (Cyclobenzaprine):   Do not drive, operate machinery, or do work that could harm people when under the influence of this medication.  It can impair perception, reaction time, motor skills, and attention in ways that make it dangerous to drive, operate machinery, or engage in any activity at home or at work that could harm others or cause professional malpractice.  Just how long such impairments will last for a particular individual taking a particular type and dose is unknown, but it is at least several hours.  Drinking alcohol while taking this medication makes impairment much worse, so abstain for alcohol use.       Pain control:   If your past medical conditions, allergies, current medications, or current status does not prevent you from using acetaminophen and/or ibuprofen, use the following:   Acetaminophen 650-1000 mg every 6 hours as needed for pain in addition to ibuprofen 400-600 mg every 6 hours as needed for pain.  Take these two medications together if wanted.    Remember that these are for AS NEEDED.  If not needed, do not take.          Follow-up with your primary care provider for reevaluation.  Contact your primary care provider if you have any questions or concerns.  Do not hesitate to return to the ER if any new or worsening symptoms.     Please read the attached instructions (if any).  They highlight more specific treatments and interventions for you at home.              Thank you for letting me participate in your care and wish you a fast and uneventful recovery,    Patricio COHEN CNP    Do not hesitate to contact me with questions or concerns.  [email or web address]  [email or web address]

## 2023-03-03 NOTE — PLAN OF CARE
Pt discharged at this time. Discharge instructions reviewed, no questions or concerns noted from patient. Discharge prescriptions sent to preferred pharmacy.

## 2023-04-30 ENCOUNTER — HEALTH MAINTENANCE LETTER (OUTPATIENT)
Age: 53
End: 2023-04-30

## 2024-07-07 ENCOUNTER — HEALTH MAINTENANCE LETTER (OUTPATIENT)
Age: 54
End: 2024-07-07

## 2025-01-05 ENCOUNTER — APPOINTMENT (OUTPATIENT)
Dept: GENERAL RADIOLOGY | Facility: OTHER | Age: 55
End: 2025-01-05
Attending: PHYSICIAN ASSISTANT
Payer: COMMERCIAL

## 2025-01-05 ENCOUNTER — HOSPITAL ENCOUNTER (EMERGENCY)
Facility: OTHER | Age: 55
Discharge: HOME | End: 2025-01-05
Attending: PHYSICIAN ASSISTANT
Payer: COMMERCIAL

## 2025-01-05 VITALS
BODY MASS INDEX: 32.21 KG/M2 | HEIGHT: 70 IN | TEMPERATURE: 96.8 F | DIASTOLIC BLOOD PRESSURE: 95 MMHG | OXYGEN SATURATION: 100 % | SYSTOLIC BLOOD PRESSURE: 145 MMHG | RESPIRATION RATE: 15 BRPM | WEIGHT: 225 LBS | HEART RATE: 72 BPM

## 2025-01-05 DIAGNOSIS — J20.5 RSV BRONCHITIS: ICD-10-CM

## 2025-01-05 DIAGNOSIS — R05.9 COUGH: ICD-10-CM

## 2025-01-05 LAB
FLUAV RNA SPEC QL NAA+PROBE: NEGATIVE
FLUBV RNA RESP QL NAA+PROBE: NEGATIVE
RSV RNA SPEC NAA+PROBE: POSITIVE
SARS-COV-2 RNA RESP QL NAA+PROBE: NEGATIVE

## 2025-01-05 PROCEDURE — 99284 EMERGENCY DEPT VISIT MOD MDM: CPT | Mod: 25 | Performed by: PHYSICIAN ASSISTANT

## 2025-01-05 PROCEDURE — 250N000009 HC RX 250: Performed by: PHYSICIAN ASSISTANT

## 2025-01-05 PROCEDURE — 94640 AIRWAY INHALATION TREATMENT: CPT

## 2025-01-05 PROCEDURE — 71046 X-RAY EXAM CHEST 2 VIEWS: CPT

## 2025-01-05 PROCEDURE — 250N000012 HC RX MED GY IP 250 OP 636 PS 637: Performed by: PHYSICIAN ASSISTANT

## 2025-01-05 PROCEDURE — 999N000157 HC STATISTIC RCP TIME EA 10 MIN

## 2025-01-05 PROCEDURE — 250N000013 HC RX MED GY IP 250 OP 250 PS 637: Performed by: PHYSICIAN ASSISTANT

## 2025-01-05 PROCEDURE — 99284 EMERGENCY DEPT VISIT MOD MDM: CPT | Performed by: PHYSICIAN ASSISTANT

## 2025-01-05 PROCEDURE — 87637 SARSCOV2&INF A&B&RSV AMP PRB: CPT | Performed by: PHYSICIAN ASSISTANT

## 2025-01-05 RX ORDER — AZITHROMYCIN 250 MG/1
250 TABLET, FILM COATED ORAL DAILY
Qty: 6 TABLET | Refills: 0 | Status: SHIPPED | OUTPATIENT
Start: 2025-01-05

## 2025-01-05 RX ORDER — CODEINE PHOSPHATE AND GUAIFENESIN 10; 100 MG/5ML; MG/5ML
1-2 SOLUTION ORAL EVERY 4 HOURS PRN
Qty: 120 ML | Refills: 0 | Status: SHIPPED | OUTPATIENT
Start: 2025-01-05

## 2025-01-05 RX ORDER — ALBUTEROL SULFATE 90 UG/1
2 INHALANT RESPIRATORY (INHALATION) EVERY 4 HOURS PRN
Qty: 18 G | Refills: 0 | Status: SHIPPED | OUTPATIENT
Start: 2025-01-05

## 2025-01-05 RX ORDER — ALBUTEROL SULFATE 90 UG/1
2 INHALANT RESPIRATORY (INHALATION) EVERY 6 HOURS PRN
Qty: 8 G | Refills: 0 | Status: SHIPPED | OUTPATIENT
Start: 2025-01-05

## 2025-01-05 RX ORDER — PREDNISONE 20 MG/1
40 TABLET ORAL ONCE
Status: COMPLETED | OUTPATIENT
Start: 2025-01-05 | End: 2025-01-05

## 2025-01-05 RX ORDER — BENZONATATE 200 MG/1
200 CAPSULE ORAL 3 TIMES DAILY PRN
Qty: 30 CAPSULE | Refills: 0 | Status: SHIPPED | OUTPATIENT
Start: 2025-01-05

## 2025-01-05 RX ORDER — PREDNISONE 10 MG/1
40 TABLET ORAL DAILY
Qty: 30 TABLET | Refills: 0 | Status: SHIPPED | OUTPATIENT
Start: 2025-01-05 | End: 2025-01-09

## 2025-01-05 RX ORDER — BENZONATATE 100 MG/1
200 CAPSULE ORAL ONCE
Status: COMPLETED | OUTPATIENT
Start: 2025-01-05 | End: 2025-01-05

## 2025-01-05 RX ORDER — ALBUTEROL SULFATE 0.83 MG/ML
2.5 SOLUTION RESPIRATORY (INHALATION) ONCE
Status: COMPLETED | OUTPATIENT
Start: 2025-01-05 | End: 2025-01-05

## 2025-01-05 RX ADMIN — BENZONATATE 200 MG: 100 CAPSULE ORAL at 10:13

## 2025-01-05 RX ADMIN — PREDNISONE 40 MG: 20 TABLET ORAL at 10:13

## 2025-01-05 RX ADMIN — ALBUTEROL SULFATE 2.5 MG: 2.5 SOLUTION RESPIRATORY (INHALATION) at 10:15

## 2025-01-05 ASSESSMENT — ACTIVITIES OF DAILY LIVING (ADL)
ADLS_ACUITY_SCORE: 41
ADLS_ACUITY_SCORE: 41

## 2025-01-05 ASSESSMENT — COLUMBIA-SUICIDE SEVERITY RATING SCALE - C-SSRS
1. IN THE PAST MONTH, HAVE YOU WISHED YOU WERE DEAD OR WISHED YOU COULD GO TO SLEEP AND NOT WAKE UP?: NO
2. HAVE YOU ACTUALLY HAD ANY THOUGHTS OF KILLING YOURSELF IN THE PAST MONTH?: NO

## 2025-01-05 NOTE — ED TRIAGE NOTES
Pt states for the past 2 weeks he has had a loose cough and congestion.  Pt states this is not getting better.  Pt is also experiencing fatigue, but denies N/V and intermittent fevers.  Pt has taken Nyquil and mucinex DM at 0800 this morning.

## 2025-01-05 NOTE — ED PROVIDER NOTES
EMERGENCY DEPARTMENT ENCOUNTER      NAME: Bayron King  AGE: 54 year old male  YOB: 1970  MRN: [id number]  EVALUATION DATE & TIME: 1/5/2025  9:16 AM    PCP: Xenia Loza    ED PROVIDER: Narinder Schafer PA-C       CHIEF COMPLAINT:  Chief Complaint   Patient presents with    Cough    Nasal Congestion         HPI  Bayron King is a pleasant 54 year old male here today for concerns of cough.  Had been having worsening cough for the past 2 weeks.  Patient having a hard time breathing secondary to the cough today.  Initially started as an upper respiratory infection where patient is having congestion runny nose.  Wife has similar symptoms.  No fevers or chills.  No chest pain.  No history of smoking or vaping.  No history of asthma.      REVIEW OF SYSTEMS   Review of Systems  As above, otherwise ROS is unremarkable.      PAST MEDICAL HISTORY:  No past medical history on file.      PAST SURGICAL HISTORY:  No past surgical history on file.      CURRENT MEDICATIONS:    Current Outpatient Medications   Medication Instructions    cyclobenzaprine (FLEXERIL) 10 mg, Oral, 3 TIMES DAILY PRN    losartan (COZAAR) 25 MG tablet TAKE 1 TABLET (25 MG) BY MOUTH ONCE DAILY.    Multiple Vitamins-Minerals (MULTIVITAMIN ADULTS PO) 1 tablet, Oral, DAILY    omeprazole (PRILOSEC) 20 MG DR capsule TAKE 1 CAPSULE BY MOUTH DAILY BEFORE A MEAL         ALLERGIES:  No Known Allergies      FAMILY HISTORY:  No family history on file.      SOCIAL HISTORY:   Social History     Socioeconomic History    Marital status:    Tobacco Use    Smoking status: Never    Smokeless tobacco: Never   Substance and Sexual Activity    Alcohol use: Yes    Drug use: No    Sexual activity: Not Currently     Social Drivers of Health     Financial Resource Strain: Not on File (12/13/2023)    Received from [facility]    Financial Resource Strain     Financial Resource Strain: 0   Food Insecurity: Not at Risk (12/12/2024)    Received from  [facility]    Food Insecurity     Food: 1   Transportation Needs: Not at Risk (2024)    Received from [facility]    Transportation Needs     Transportation: 1   Physical Activity: Not on File (2023)    Received from [facility]    Physical Activity     Physical Activity: 0   Stress: Not on File (2023)    Received from [facility]    Stress     Stress: 0   Social Connections: Not on File (9/15/2024)    Received from [facility]    Social Connections     Connectedness: 0   Housing Stability: Not at Risk (2024)    Received from [facility]    Housing Stability     Housin       ==================================================================================================================================    PHYSICAL EXAM    VITAL SIGNS: BP (!) 145/95   Pulse 72   Temp 96.8  F (36  C) (Tympanic)   Resp 15   Ht 1.778 m (5' 10\")   Wt 102.1 kg (225 lb)   SpO2 100%   BMI 32.28 kg/m      Patient Vitals for the past 24 hrs:   BP Temp Temp src Pulse Resp SpO2 Height Weight   25 1015 -- -- -- -- -- 100 % -- --   25 0911 (!) 145/95 96.8  F (36  C) Tympanic 72 15 97 % 1.778 m (5' 10\") 102.1 kg (225 lb)       Physical Exam  Constitutional:       Appearance: Normal appearance. He is not ill-appearing or diaphoretic.   HENT:      Nose: Nose normal.      Mouth/Throat:      Mouth: Mucous membranes are moist.      Pharynx: Oropharynx is clear.   Eyes:      Conjunctiva/sclera: Conjunctivae normal.   Cardiovascular:      Rate and Rhythm: Normal rate and regular rhythm.      Pulses: Normal pulses.      Heart sounds: Normal heart sounds.   Pulmonary:      Effort: Pulmonary effort is normal. No respiratory distress.      Breath sounds: Normal breath sounds. No wheezing, rhonchi or rales.   Musculoskeletal:         General: No tenderness. Normal range of motion.      Cervical back: Normal range of motion.      Right lower leg: No edema.      Left lower leg: No edema.   Neurological:      General: No focal deficit present.      " "Mental Status: He is alert.   Psychiatric:         Mood and Affect: Mood normal.            ==================================================================================================================================    LABS & RADIOLOGY:  All pertinent labs reviewed and interpreted. Reviewed all pertinent imaging. Please see official radiology report.  Results for orders placed or performed during the hospital encounter of 01/05/25   XR Chest 2 Views    Impression    IMPRESSION: Negative chest.   Influenza A/B, RSV and SARS-CoV2 PCR (COVID-19) Nose    Specimen: Nose; Swab   Result Value Ref Range    Influenza A PCR Negative Negative    Influenza B PCR Negative Negative    RSV PCR Positive (A) Negative    SARS CoV2 PCR Negative Negative     XR Chest 2 Views   Final Result   IMPRESSION: Negative chest.            ==================================================================================================================================    ED COURSE, MEDICAL DECISION MAKING, FINAL IMPRESSION AND PLAN:     Assessment / Plan:  1. RSV bronchitis    2. Cough        The patient was interviewed and examined.  HPI and physical exam as below.  Differential diagnosis and MDM Key Documentation Elements as below.  Vitals, triage note, and nursing notes were reviewed.  BP (!) 145/95   Pulse 72   Temp 96.8  F (36  C) (Tympanic)   Resp 15   Ht 1.778 m (5' 10\")   Wt 102.1 kg (225 lb)   SpO2 100%   BMI 32.28 kg/m      Differential includes but is not limited to, COVID, influenza, RSV, bronchitis    RSV positive.  Patient was not septic.  Chest x-ray unremarkable for acute pneumonia.  Prednisone, Tessalon Perles, and albuterol nebulizer given today with improvement of cough.  Patient be sent home on Z-William, prednisone, Tessalon Perles, albuterol inhaler, Robitussin for symptomatic relief.  Return to the ER for any new or worsening symptoms.  Follow-up with primary care.    Pertinent Labs & Imaging studies reviewed. " "(See chart for details)  Results for orders placed or performed during the hospital encounter of 01/05/25   XR Chest 2 Views    Impression    IMPRESSION: Negative chest.   Influenza A/B, RSV and SARS-CoV2 PCR (COVID-19) Nose    Specimen: Nose; Swab   Result Value Ref Range    Influenza A PCR Negative Negative    Influenza B PCR Negative Negative    RSV PCR Positive (A) Negative    SARS CoV2 PCR Negative Negative     No results found for: \"ABORH\"      Reassessments, Medications, Interventions, & Response to Treatments:  -as above    Medications given during today's ER visit:  Medications   albuterol (PROVENTIL) neb solution 2.5 mg (2.5 mg Nebulization $Given 1/5/25 1015)   predniSONE (DELTASONE) tablet 40 mg (40 mg Oral $Given 1/5/25 1013)   benzonatate (TESSALON) capsule 200 mg (200 mg Oral $Given 1/5/25 1013)       Consultations:  None    Decision Rules, Medical Calculators, and Risk Stratification Tools:  None    MDM Key Documentation Elements for Patient's Evaluation:  Differential diagnosis to include high risk considerations: As above  Escalation to admission/observation considered: Admission/observation considered, but patient does not meet admission criteria  Discussions and management with other clinicians:    3a. Independent interpretation of testing performed by another health professional:  -No  3b. Discussion of management or test interpretation with another health professional: -No  Independent interpretation of tests:  Ordering and/or review of 1 test(s)  Discussion of test interpretations with radiology:  No  History obtained from source other than patient or assessment requiring an independent historian:  No  Review of non-ED/external records:  review of 1 records  Diagnostic tests considered but not ultimately performed/deferred:  - CBC  Prescription medications considered but not prescribed:  - augmentin  Chronic conditions affecting care:  -None  Care affected by social determinants of health:  " -None    The patient's management involved:   - Laboratory studies  - Imaging studies  - Prescription drug management      A shared decision making model was used. Time was taken to answer all questions.  Patient and/or associated parties understood and were agreeable to treatment plan.  Plan and all results were discussed. Warning signs and close return precautions to return to the ED given. Copy of results given. Discharged in stable condition. Discharged with discharge instructions outlining plan for further care and follow up.      New prescriptions started at today's ER visit  Discharge Medication List as of 1/5/2025 11:13 AM        START taking these medications    Details   albuterol (VENTOLIN HFA) 108 (90 Base) MCG/ACT inhaler Inhale 2 puffs into the lungs every 6 hours as needed for shortness of breath, wheezing or cough., Disp-8 g, R-0, [facility] may dispense brand covered by insurance (Proair, or proventil or ventolin or generic albuterol inhaler)      azithromycin (ZITHROMAX) 250 MG tablet Take 1 tablet (250 mg) by mouth daily., Disp-6 tablet, R-0, [facility]      guaiFENesin-codeine (ROBITUSSIN AC) 100-10 MG/5ML solution Take 5-10 mLs by mouth every 4 hours as needed for cough., Disp-120 mL, R-0, [facility]      predniSONE (DELTASONE) 10 MG tablet Take 4 tablets (40 mg) by mouth daily for 4 days. Start tomorrow morning. You kervin lhave excess, Disp-30 tablet, R-0, [facility]             ==================================================================================================================================      Navi HARDY PA-C, personally performed the services described in this documentation, and it is both accurate and complete.       Narinder Scahfer PA-C  01/05/25 1136

## 2025-01-05 NOTE — DISCHARGE INSTRUCTIONS
Today, take Z-William for coverage against potential atypical pneumonia.  Use prednisone, albuterol, and Robitussin for cough suppression and inflammation secondary to RSV bronchitis.  Symptoms should improve over the next 1 to 2 weeks.  Return to the ER for any worsening of symptoms.  Recommend close up with your primary care doctor in 3 to 5 days for recheck.

## (undated) RX ORDER — PREDNISONE 20 MG/1
TABLET ORAL
Status: DISPENSED
Start: 2025-01-05

## (undated) RX ORDER — ALBUTEROL SULFATE 1.25 MG/3ML
SOLUTION RESPIRATORY (INHALATION)
Status: DISPENSED
Start: 2025-01-05

## (undated) RX ORDER — ALBUTEROL SULFATE 0.83 MG/ML
SOLUTION RESPIRATORY (INHALATION)
Status: DISPENSED
Start: 2025-01-05

## (undated) RX ORDER — BENZONATATE 100 MG/1
CAPSULE ORAL
Status: DISPENSED
Start: 2025-01-05